# Patient Record
Sex: FEMALE | Race: ASIAN | Employment: OTHER | URBAN - METROPOLITAN AREA
[De-identification: names, ages, dates, MRNs, and addresses within clinical notes are randomized per-mention and may not be internally consistent; named-entity substitution may affect disease eponyms.]

---

## 2019-09-19 ENCOUNTER — OFFICE VISIT (OUTPATIENT)
Dept: NEUROLOGY | Facility: CLINIC | Age: 59
End: 2019-09-19
Payer: COMMERCIAL

## 2019-09-19 VITALS
DIASTOLIC BLOOD PRESSURE: 82 MMHG | HEART RATE: 66 BPM | RESPIRATION RATE: 16 BRPM | HEIGHT: 63 IN | WEIGHT: 145 LBS | BODY MASS INDEX: 25.69 KG/M2 | SYSTOLIC BLOOD PRESSURE: 120 MMHG

## 2019-09-19 DIAGNOSIS — G44.011 INTRACTABLE EPISODIC CLUSTER HEADACHE: ICD-10-CM

## 2019-09-19 DIAGNOSIS — G43.009 MIGRAINE WITHOUT AURA AND WITHOUT STATUS MIGRAINOSUS, NOT INTRACTABLE: ICD-10-CM

## 2019-09-19 DIAGNOSIS — M54.2 NECK PAIN: ICD-10-CM

## 2019-09-19 DIAGNOSIS — M62.838 NECK MUSCLE SPASM: Primary | ICD-10-CM

## 2019-09-19 PROCEDURE — 99204 OFFICE O/P NEW MOD 45 MIN: CPT | Performed by: OTHER

## 2019-09-19 RX ORDER — PROPRANOLOL HYDROCHLORIDE 20 MG/1
20 TABLET ORAL 2 TIMES DAILY
Qty: 60 TABLET | Refills: 5 | Status: SHIPPED | OUTPATIENT
Start: 2019-09-19 | End: 2019-12-05

## 2019-09-19 RX ORDER — CYCLOBENZAPRINE HCL 10 MG
10 TABLET ORAL 3 TIMES DAILY PRN
Qty: 60 TABLET | Refills: 3 | Status: SHIPPED | OUTPATIENT
Start: 2019-09-19 | End: 2019-12-05

## 2019-09-19 RX ORDER — ELETRIPTAN HYDROBROMIDE 40 MG/1
TABLET, FILM COATED ORAL
Qty: 8 TABLET | Refills: 0 | Status: SHIPPED | OUTPATIENT
Start: 2019-09-19

## 2019-09-19 NOTE — PROGRESS NOTES
[facility]   Neurology; INITIAL CLINIC VISIT  2019, 10:02 AM     [place] Patient Status:  No patient class for patient encounter    1960 MRN [id number]   Location [address] [place] [address] Tray Gee total) by mouth 2 (two) times daily. Disp: 60 tablet Rfl: 5   Eletriptan Hydrobromide 40 MG Oral Tab use at onset; may repeat once after 4 hours- ONLY 2 IN 24 HOUR PERIOD MAX. This is a 30 day supply.  Disp: 8 tablet Rfl: 0   Cyclobenzaprine HCl 10 MG Oral clear to auscultation  Heart: normal SR, no murmur  Extremities:  No edema or cyanosis, radial and pedal pulse is normal.  Skin:  No unusual lesions    Neurologic Examination:  Mental status: he is awake, alert, oriented to time, name and place, Mentally a without aura and without status migrainosus, not intractable    (M54.2) Neck pain        [place]:  Clinically, This  patient presented with increasing frequent migraine and neck pain, I feel she has both frequent migraine and neck pain,   Neurological Exam

## 2019-09-23 ENCOUNTER — TELEPHONE (OUTPATIENT)
Dept: NEUROLOGY | Facility: CLINIC | Age: 59
End: 2019-09-23

## 2019-09-23 NOTE — TELEPHONE ENCOUNTER
Patient states the pharmacy gave her only 1 of her three Rx's refilled on 9/19. Spoke with Sweetie Olmos, pharmacist at [facility] who states patient needs to . Patient notified of above and verbalized understanding.

## 2019-10-07 ENCOUNTER — TELEPHONE (OUTPATIENT)
Dept: PHYSICAL THERAPY | Facility: HOSPITAL | Age: 59
End: 2019-10-07

## 2019-10-08 ENCOUNTER — APPOINTMENT (OUTPATIENT)
Dept: PHYSICAL THERAPY | Facility: HOSPITAL | Age: 59
End: 2019-10-08
Attending: PEDIATRICS
Payer: COMMERCIAL

## 2019-10-22 ENCOUNTER — APPOINTMENT (OUTPATIENT)
Dept: PHYSICAL THERAPY | Facility: HOSPITAL | Age: 59
End: 2019-10-22
Attending: PEDIATRICS
Payer: COMMERCIAL

## 2019-10-24 ENCOUNTER — OFFICE VISIT (OUTPATIENT)
Dept: PHYSICAL THERAPY | Facility: HOSPITAL | Age: 59
End: 2019-10-24
Attending: PEDIATRICS
Payer: COMMERCIAL

## 2019-10-24 DIAGNOSIS — M62.838 NECK MUSCLE SPASM: ICD-10-CM

## 2019-10-24 PROCEDURE — 97140 MANUAL THERAPY 1/> REGIONS: CPT

## 2019-10-24 PROCEDURE — 97161 PT EVAL LOW COMPLEX 20 MIN: CPT

## 2019-10-24 PROCEDURE — 97110 THERAPEUTIC EXERCISES: CPT

## 2019-10-24 NOTE — PROGRESS NOTES
CERVICAL SPINE EVALUATION:   Referring Physician: Casey Kauffman MD    Date of Onset: 4 years ago  Date of Service: 10/24/19   Diagnosis: Neck muscle spasm (W02.723)   SUBJECTIVE:   PATIENT SUMMARY:  Nikky Hutchins is a 63year old y/o female who presents t Sidebend Min loss +   R Rotation Min loss +   L Rotation Min loss +   Protrusion No loss    Retraction Mod loss +     Repeated Motion Testing:   Seated c retraction - I, NW B UT  Seated c flexion - I, NW B UT  Seated c extension - I, NW B neck  Posture cor good  FOTO: 61/100    Patient was advised of these findings, precautions, and treatment options and has agreed to actively participate in planning and for this course of care.     Thank you for your referral. Please co-sign or sign and return this letter vi

## 2019-10-29 ENCOUNTER — OFFICE VISIT (OUTPATIENT)
Dept: PHYSICAL THERAPY | Facility: HOSPITAL | Age: 59
End: 2019-10-29
Attending: PEDIATRICS
Payer: COMMERCIAL

## 2019-10-29 PROCEDURE — 97140 MANUAL THERAPY 1/> REGIONS: CPT

## 2019-10-29 PROCEDURE — 97110 THERAPEUTIC EXERCISES: CPT

## 2019-10-29 NOTE — PROGRESS NOTES
Referring Physician: Deepak Melendez MD     Date of Onset: 4 years ago  Date of Service: 10/24/19   Diagnosis: Neck muscle spasm (J37.496)       Insurance (Authorized # of Visits):  8         Authorizing Physician: Dr. Stephani Carey MD visit: December 5th with  Modalities: MHP at beginning of session x 8 minutes in sitting       Manual: (25 minutes) STM to B UT and cervical paraspinals,thoracic spine, SOR       There Ex (15 minutes)  Demo/return demo on sleeping postures using towel roll for neck support, sitti

## 2019-10-30 NOTE — PATIENT INSTRUCTIONS
Use a towel or scarf and place it around your neck as shown.   While gently pulling down, slowly move your head to look up 10 constanza  Tilt your head to each side 10 times  Try this at least 1-2 times per day

## 2019-10-31 ENCOUNTER — OFFICE VISIT (OUTPATIENT)
Dept: PHYSICAL THERAPY | Facility: HOSPITAL | Age: 59
End: 2019-10-31
Attending: PEDIATRICS
Payer: COMMERCIAL

## 2019-10-31 PROCEDURE — 97110 THERAPEUTIC EXERCISES: CPT

## 2019-10-31 PROCEDURE — 97140 MANUAL THERAPY 1/> REGIONS: CPT

## 2019-10-31 NOTE — PROGRESS NOTES
Referring Physician: Alex Helton MD     Date of Onset: 4 years ago  Date of Service: 10/24/19   Diagnosis: Neck muscle spasm (I63.395)       Insurance (Authorized # of Visits):  8         Authorizing Physician: Dr. Lucina Fernandes  Next MD visit: December 5th with  Manual: (25 minutes) STM to B UT and cervical paraspinals,thoracic spine, SOR Manual: (15 min)  STM BU and cervical paraspinals with pin and stretch  SOR  Gr II/III thoracic PA mobs        There Ex (15 minutes)  Demo/return demo on sleeping postures

## 2019-11-05 ENCOUNTER — OFFICE VISIT (OUTPATIENT)
Dept: PHYSICAL THERAPY | Facility: HOSPITAL | Age: 59
End: 2019-11-05
Attending: PEDIATRICS
Payer: COMMERCIAL

## 2019-11-05 PROCEDURE — 97110 THERAPEUTIC EXERCISES: CPT

## 2019-11-05 PROCEDURE — 97140 MANUAL THERAPY 1/> REGIONS: CPT

## 2019-11-06 NOTE — PROGRESS NOTES
Referring Physician: Donita Felix MD     Date of Onset: 4 years ago  Date of Service: 10/24/19   Diagnosis: Neck muscle spasm (K09.223)       Insurance (Authorized # of Visits):  8         Authorizing Physician: Dr. Elbert Ordoñez  Next MD visit: December 5th with  paraspinals,thoracic spine, SOR Manual: (15 min)  STM BU and cervical paraspinals with pin and stretch  SOR  Gr II/III thoracic PA mobs   Manual (15 min):  STM L UT, levator, cervical paraspinals with pin and stretch   SOR  Manual cervical traction     The

## 2019-11-07 ENCOUNTER — OFFICE VISIT (OUTPATIENT)
Dept: PHYSICAL THERAPY | Facility: HOSPITAL | Age: 59
End: 2019-11-07
Attending: PEDIATRICS
Payer: COMMERCIAL

## 2019-11-07 PROCEDURE — 97110 THERAPEUTIC EXERCISES: CPT

## 2019-11-07 PROCEDURE — 97140 MANUAL THERAPY 1/> REGIONS: CPT

## 2019-11-07 NOTE — PROGRESS NOTES
Referring Physician: Mitesh Gilman MD     Date of Onset: 4 years ago  Date of Service: 10/24/19   Diagnosis: Neck muscle spasm (X59.791)       Insurance (Authorized # of Visits):  8         Authorizing Physician: Dr. Grant Or  Next MD visit: December 5th with  cervical paraspinals,thoracic spine, SOR Manual: (15 min)  STM BU and cervical paraspinals with pin and stretch  SOR  Gr II/III thoracic PA mobs   Manual (15 min):  STM L UT, levator, cervical paraspinals with pin and stretch   SOR  Manual cervical tractio band, issued RTB        Charges: MM x 1, TE x 2      Total Timed Treatment: 40 min  Total Treatment Time: 42 min

## 2019-11-12 ENCOUNTER — APPOINTMENT (OUTPATIENT)
Dept: PHYSICAL THERAPY | Facility: HOSPITAL | Age: 59
End: 2019-11-12
Attending: PEDIATRICS
Payer: COMMERCIAL

## 2019-11-19 ENCOUNTER — OFFICE VISIT (OUTPATIENT)
Dept: PHYSICAL THERAPY | Facility: HOSPITAL | Age: 59
End: 2019-11-19
Attending: Other
Payer: COMMERCIAL

## 2019-11-19 PROCEDURE — 97140 MANUAL THERAPY 1/> REGIONS: CPT

## 2019-11-19 PROCEDURE — 97110 THERAPEUTIC EXERCISES: CPT

## 2019-11-19 NOTE — PROGRESS NOTES
Referring Physician: Alex Helton MD     Date of Onset: 4 years ago  Date of Service: 10/24/19   Diagnosis: Neck muscle spasm (B31.087)       Insurance (Authorized # of Visits):  8         Authorizing Physician: Dr. Lucina Fernandes  Next MD visit: December 5th with  cervical paraspinals,thoracic spine, SOR Manual: (15 min)  STM BU and cervical paraspinals with pin and stretch  SOR  Gr II/III thoracic PA mobs   Manual (15 min):  STM L UT, levator, cervical paraspinals with pin and stretch   SOR  Manual cervical tractio R/L  Narrow rows with RTB 20x  Sh ext with RTB 20x  Wall push ups 2x10     HEP: 10/29/2019  Issued handout for doorway stretch and neck ROM with towel  To be aware of sleeping, sitting and working postures.     seated upper trap trap stretch and seated scap

## 2019-11-26 ENCOUNTER — OFFICE VISIT (OUTPATIENT)
Dept: PHYSICAL THERAPY | Facility: HOSPITAL | Age: 59
End: 2019-11-26
Attending: Other
Payer: COMMERCIAL

## 2019-11-26 PROCEDURE — 97110 THERAPEUTIC EXERCISES: CPT

## 2019-11-26 PROCEDURE — 97140 MANUAL THERAPY 1/> REGIONS: CPT

## 2019-11-26 NOTE — PROGRESS NOTES
Referring Physician: Stephenie Graves MD     Date of Onset: 4 years ago  Date of Service: 10/24/19   Diagnosis: Neck muscle spasm (K82.222)       Insurance (Authorized # of Visits):  8         Authorizing Physician: Dr. Jourdan Carey MD visit: December 5th with  7/8   Modalities: MHP at beginning of session x 8 minutes in sitting        Manual: (25 minutes) STM to B UT and cervical paraspinals,thoracic spine, SOR Manual: (15 min)  STM BU and cervical paraspinals with pin and stretch  SOR  Gr II/III thoracic PA mob ER with RTB 15x TherEx (25 min)  Prone c retraction 2x10  Prone scap retraction 2x10  Prone horiz abd 2x10  Prone W squeeze 2x10  Forearm towel slides 2x10  Seated c retraction with therapist OP 10x  Standing sh ER with RTB 15x R/L  Narrow rows with RTB 20

## 2019-12-03 ENCOUNTER — OFFICE VISIT (OUTPATIENT)
Dept: PHYSICAL THERAPY | Facility: HOSPITAL | Age: 59
End: 2019-12-03
Attending: Other
Payer: COMMERCIAL

## 2019-12-03 PROCEDURE — 97110 THERAPEUTIC EXERCISES: CPT

## 2019-12-04 NOTE — PROGRESS NOTES
ProgressSummary  Pt has attended 8 visits in Physical Therapy.      Referring Physician: Alvaro Pires MD     Date of Onset: 4 years ago  Date of Service: 10/24/19   Diagnosis: Neck muscle spasm (N12.842)       Insurance (Authorized # of Visits):  8 will demo B scap strength at least 4-/5 to improve postural alignment with ADLs - UNMET    FOTO: 76/100    Plan: Follow up with MD, may continue or d/c PT pending MD recommendation.  Patient to call clinic following her appointment on 12/5/19    Patient was phone use  Seated c ext with towel support 2x10  Forearm towel slides 2x10  Seated thoracic ext over ball 2x10  L rotation SNAG with towel 2x10 (improved L rotation following)     TherEx: (25 min)  Supine chin nods 2x10  Supine L rotation 10x  Seated c ret

## 2019-12-05 ENCOUNTER — OFFICE VISIT (OUTPATIENT)
Dept: NEUROLOGY | Facility: CLINIC | Age: 59
End: 2019-12-05
Payer: COMMERCIAL

## 2019-12-05 VITALS
DIASTOLIC BLOOD PRESSURE: 74 MMHG | BODY MASS INDEX: 26 KG/M2 | RESPIRATION RATE: 16 BRPM | WEIGHT: 145 LBS | SYSTOLIC BLOOD PRESSURE: 122 MMHG | HEART RATE: 76 BPM

## 2019-12-05 DIAGNOSIS — M62.838 NECK MUSCLE SPASM: ICD-10-CM

## 2019-12-05 DIAGNOSIS — G44.031 INTRACTABLE PAROXYSMAL HEMICRANIA, UNSPECIFIED CHRONICITY PATTERN: ICD-10-CM

## 2019-12-05 DIAGNOSIS — M54.2 NECK PAIN: Primary | ICD-10-CM

## 2019-12-05 DIAGNOSIS — G43.001 MIGRAINE WITHOUT AURA AND WITH STATUS MIGRAINOSUS, NOT INTRACTABLE: ICD-10-CM

## 2019-12-05 PROCEDURE — 99214 OFFICE O/P EST MOD 30 MIN: CPT | Performed by: OTHER

## 2019-12-05 RX ORDER — PROPRANOLOL HYDROCHLORIDE 20 MG/1
20 TABLET ORAL 2 TIMES DAILY
Qty: 60 TABLET | Refills: 5 | Status: SHIPPED | OUTPATIENT
Start: 2019-12-05

## 2019-12-05 RX ORDER — CYCLOBENZAPRINE HCL 10 MG
10 TABLET ORAL 3 TIMES DAILY PRN
Qty: 60 TABLET | Refills: 3 | Status: SHIPPED | OUTPATIENT
Start: 2019-12-05

## 2019-12-05 RX ORDER — BUTALBITAL, ACETAMINOPHEN AND CAFFEINE 50; 325; 40 MG/1; MG/1; MG/1
1 TABLET ORAL EVERY 4 HOURS PRN
Qty: 30 TABLET | Refills: 5 | Status: SHIPPED | OUTPATIENT
Start: 2019-12-05

## 2019-12-05 NOTE — PROGRESS NOTES
[address]   Neurology; follow up CLINIC VISIT  2019    Tia Parmar Patient Status:  No patient class for patient encounter    1960 MRN [id number]   Location [facility], [facility], Kim Guerra PCP N file.    SOCIAL HISTORY:   reports that she has never smoked. She has never used smokeless tobacco. She reports that she does not drink alcohol or use drugs.     ALLERGIES:  No Known Allergies    MEDICATIONS:  Current Outpatient Medications   Medication Sig distress. 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

## 2020-01-24 NOTE — PROGRESS NOTES
Patient has not returned to PT since progress report written on 12/3/19. Patient will be discharged from PT at this time.